# Patient Record
Sex: FEMALE | Race: WHITE | Employment: UNEMPLOYED | URBAN - METROPOLITAN AREA
[De-identification: names, ages, dates, MRNs, and addresses within clinical notes are randomized per-mention and may not be internally consistent; named-entity substitution may affect disease eponyms.]

---

## 2022-05-14 PROBLEM — M17.0 PRIMARY OSTEOARTHRITIS OF BOTH KNEES: Status: ACTIVE | Noted: 2022-05-14

## 2022-06-20 ENCOUNTER — APPOINTMENT (OUTPATIENT)
Dept: PHYSICAL THERAPY | Age: 57
End: 2022-06-20
Attending: FAMILY MEDICINE
Payer: COMMERCIAL

## 2022-06-23 ENCOUNTER — TELEPHONE (OUTPATIENT)
Dept: PHYSICAL THERAPY | Facility: HOSPITAL | Age: 57
End: 2022-06-23

## 2022-06-24 ENCOUNTER — APPOINTMENT (OUTPATIENT)
Dept: PHYSICAL THERAPY | Age: 57
End: 2022-06-24
Attending: FAMILY MEDICINE
Payer: COMMERCIAL

## 2022-06-27 ENCOUNTER — APPOINTMENT (OUTPATIENT)
Dept: PHYSICAL THERAPY | Age: 57
End: 2022-06-27
Attending: FAMILY MEDICINE
Payer: COMMERCIAL

## 2022-06-27 ENCOUNTER — TELEPHONE (OUTPATIENT)
Dept: PHYSICAL THERAPY | Facility: HOSPITAL | Age: 57
End: 2022-06-27

## 2022-06-30 ENCOUNTER — OFFICE VISIT (OUTPATIENT)
Dept: PHYSICAL THERAPY | Age: 57
End: 2022-06-30
Attending: FAMILY MEDICINE
Payer: COMMERCIAL

## 2022-06-30 PROCEDURE — 97530 THERAPEUTIC ACTIVITIES: CPT | Performed by: PHYSICAL THERAPIST

## 2022-06-30 PROCEDURE — 97161 PT EVAL LOW COMPLEX 20 MIN: CPT | Performed by: PHYSICAL THERAPIST

## 2022-06-30 PROCEDURE — 97110 THERAPEUTIC EXERCISES: CPT | Performed by: PHYSICAL THERAPIST

## 2022-07-06 ENCOUNTER — OFFICE VISIT (OUTPATIENT)
Dept: PHYSICAL THERAPY | Age: 57
End: 2022-07-06
Attending: FAMILY MEDICINE
Payer: COMMERCIAL

## 2022-07-06 PROCEDURE — 97110 THERAPEUTIC EXERCISES: CPT | Performed by: PHYSICAL THERAPIST

## 2022-07-06 NOTE — PROGRESS NOTES
Diagnosis:   Arthritis of knee (M17.10)   Referring Provider: Anuel Groves  Date of Evaluation:    6/30/2022    Precautions:  None Next MD visit:   none scheduled  Date of Surgery: n/a   Patient requested :  Rayray Pool [id number]    Insurance Primary/Secondary: [address] [place] [address] HMO / N/A     # Auth Visits: 12 visits through 9/29/2022        Subjective: C/o a little discomfort with stretches - not much. Swim ~ 5 days weekly. Had cortisone injections in both knees ~ 4 years ago. Pain: 3/10      Objective:    AROM: (* denotes performed with pain)  Knee   Flexion: R 125; L 118  Extension: R -12; L -12      Hypomobile L/R patella sup/inf glides; med/lat WNL    Assessment: Pt with some improvement in B knee AROM since PT Evaluation, although deficits persist. Progressing knee ROM, hip/knee strengthening, flexibility in effort to improved pt's function and ADL tolerance. Pt tolerated session well. Goals:    (to be met in 8-12 visits)  1. Patient to report independence with PT HEP to facilitate self management and to maintain progress made with PT.   2. Patient to have at least - 10 deg active L/R knee ext to facilitate heel strike of gait and standing tolerance. 3. Pt to have at least 125 deg active L/R knee flexion to facilitate transfers, retrieving objects from ground. 4. Patient to have 5-/5 gross hip/knee MMT for improved stair negotiation, squatting tolerance. 5. Patient to be able to walk/stand each 10 min in order to facilitate ADL. Plan: Continue PT 2 x weekly for 8-12 visits. Add quad, hamstrings, gastroc stretches. Date: 7/6/2022  TX#: 2/8-12 Date:                 TX#: 3/ Date:                 TX#: 4/ Date:                 TX#: 5/ Date: Tx#: 6/   There Ex:   HEP review; see below.    Supine LE supported on blue ball: B knee flex <-> ext AAROM 5 x 10 sec each  Bridge on blue ball x10 reps  S/L L/R hip ER x 10 reps each without band, 2 x 10 reps each using RTB  Supine L/R hip ER ROM/stretch 2x 20 sec each  Stationary bike: level 1  - pt c/o knee pain forward, stopped; backward x 1 min  Reformer: B squats x 20 reps using red x 2, blue, yellow, green; L/R single leg squat x 10 reps using red x 2, blue, yellow, green       Manual:   L/R patella sup/inf glides grade II-III                       HEP: Seated L/R knee ext mobilization/stretch, Seated L/R knee flexion ROM/heel slides, s/l hip circles, bridge with ball/pillow for adductor isometric    Charges: 3 TE     Total Timed Treatment: 40 min  Total Treatment Time: 45 min

## 2022-07-07 ENCOUNTER — HOSPITAL ENCOUNTER (OUTPATIENT)
Dept: MAMMOGRAPHY | Facility: HOSPITAL | Age: 57
Discharge: HOME | End: 2022-07-07
Attending: FAMILY MEDICINE
Payer: COMMERCIAL

## 2022-07-07 DIAGNOSIS — Z12.31 ENCOUNTER FOR SCREENING MAMMOGRAM FOR BREAST CANCER: ICD-10-CM

## 2022-07-07 PROCEDURE — 77063 BREAST TOMOSYNTHESIS BI: CPT | Performed by: FAMILY MEDICINE

## 2022-07-07 PROCEDURE — 77067 SCR MAMMO BI INCL CAD: CPT | Performed by: FAMILY MEDICINE

## 2022-07-08 ENCOUNTER — OFFICE VISIT (OUTPATIENT)
Dept: PHYSICAL THERAPY | Age: 57
End: 2022-07-08
Attending: FAMILY MEDICINE
Payer: COMMERCIAL

## 2022-07-08 PROCEDURE — 97110 THERAPEUTIC EXERCISES: CPT | Performed by: PHYSICAL THERAPIST

## 2022-07-08 NOTE — PROGRESS NOTES
Diagnosis:   Arthritis of knee (M17.10)   Referring Provider: Rosa Aguila  Date of Evaluation:    6/30/2022    Precautions:  None Next MD visit:   none scheduled  Date of Surgery: n/a   Patient requested :  Marni Becker #[id number]    Insurance Primary/Secondary: [address] [place] [address] HMO / N/A     # Auth Visits: 12 visits through 9/29/2022        Subjective:C/o pain above knees starting yesterday when transferring to standing after sitting. Knees were a little sore for a few hours after last session. Pain: 5-6/10      Objective:    Hypomobile R patella sup/inf glides, L WFL today; L/R med/lat glides WNL    Assessment:   Pt with some c/o B knee pain - quad tendon region. Pt denies c/o upon palpation to L/R quad tendons. Incorporated additional quad strengthening and stretching today. Pt tolerated well. Pt reported 1/10 B knee pain at close of PT session  Pt with improved there ex tolerance today without c/o. Pt reports continues to swim ~ 5 days weekly, which helps with her knee c/o. Advised that swimming beneficial for B knee OA, to continue as well tolerated. Goals:    (to be met in 8-12 visits)  1. Patient to report independence with PT HEP to facilitate self management and to maintain progress made with PT.   2. Patient to have at least - 10 deg active L/R knee ext to facilitate heel strike of gait and standing tolerance. 3. Pt to have at least 125 deg active L/R knee flexion to facilitate transfers, retrieving objects from ground. 4. Patient to have 5-/5 gross hip/knee MMT for improved stair negotiation, squatting tolerance. 5. Patient to be able to walk/stand each 10 min in order to facilitate ADL. Plan: Continue PT 2 x weekly for 8-12 visits. Pt reports going to [facility] for 2 weeks, will return to PT 8/2022. Advised Hep compliance as well tolerated. Date: 7/6/2022  TX#: 2/8-12 Date:7/8/2022                 TX#: 3/8-12 Date:                 TX#: 4/ Date:                 TX#: 5/ Date:    Tx#: 6/   There Ex:   HEP review; see below. Supine LE supported on blue ball: B knee flex <-> ext AAROM 5 x 10 sec each  Bridge on blue ball x10 reps  S/L L/R hip ER x 10 reps each without band, 2 x 10 reps each using RTB  Supine L/R hip ER ROM/stretch 2x 20 sec each  Stationary bike: level 1  - pt c/o knee pain forward, stopped; backward x 1 min  Reformer: B squats x 20 reps using red x 2, blue, yellow, green; L/R single leg squat x 10 reps using red x 2, blue, yellow, green There Ex:   HEP review; see below.    Supine LE supported on blue ball: B knee flex <-> ext AAROM 5 x 10 sec each  Bridge on blue ball 2 x10 reps  Supine L/R hamstrings stretches  X 20 sec each  Supine L/R SAQ with bolster under knees 10 x 3 sec each  Supine L/R hip ER ROM/stretch 2x 20 sec each  S/L L/R hip ER x 25 reps each without band due to c/o knee pain with band  Prone L/R quad stretch using strap 3 x 20 sec each  Reviewed considerations for knees: MHP/cold pack and when appropriate; knee braces with patella aperture - pt reports has used in past without reduction in c/o; supportive footwear  Stationary bike: level 1 x 2 min  Reformer: B squats with RTB proximal to knees for hip abd/ER 2 x 10 reps - using all springs/tension        Manual:   L/R patella sup/inf glides grade II-III   Manual:   L/R patella sup/inf glides grade II-III                    HEP: Seated L/R knee ext mobilization/stretch, Seated L/R knee flexion ROM/heel slides, s/l hip circles, bridge with ball/pillow for adductor isometric    Charges: 3 TE     Total Timed Treatment: 40 min  Total Treatment Time: 45 min

## 2022-07-11 ENCOUNTER — APPOINTMENT (OUTPATIENT)
Dept: PHYSICAL THERAPY | Age: 57
End: 2022-07-11
Attending: FAMILY MEDICINE
Payer: COMMERCIAL

## 2022-07-14 ENCOUNTER — APPOINTMENT (OUTPATIENT)
Dept: PHYSICAL THERAPY | Age: 57
End: 2022-07-14
Attending: FAMILY MEDICINE
Payer: COMMERCIAL

## 2022-08-01 ENCOUNTER — APPOINTMENT (OUTPATIENT)
Dept: PHYSICAL THERAPY | Age: 57
End: 2022-08-01
Attending: FAMILY MEDICINE
Payer: COMMERCIAL

## 2022-08-03 ENCOUNTER — APPOINTMENT (OUTPATIENT)
Dept: PHYSICAL THERAPY | Age: 57
End: 2022-08-03
Payer: COMMERCIAL

## 2022-08-08 ENCOUNTER — APPOINTMENT (OUTPATIENT)
Dept: PHYSICAL THERAPY | Age: 57
End: 2022-08-08
Payer: COMMERCIAL

## 2022-08-10 ENCOUNTER — APPOINTMENT (OUTPATIENT)
Dept: PHYSICAL THERAPY | Age: 57
End: 2022-08-10
Payer: COMMERCIAL

## 2022-08-17 ENCOUNTER — APPOINTMENT (OUTPATIENT)
Dept: PHYSICAL THERAPY | Age: 57
End: 2022-08-17
Payer: COMMERCIAL

## 2022-08-19 ENCOUNTER — OFFICE VISIT (OUTPATIENT)
Dept: PHYSICAL THERAPY | Age: 57
End: 2022-08-19
Attending: FAMILY MEDICINE
Payer: COMMERCIAL

## 2022-08-19 PROCEDURE — 97110 THERAPEUTIC EXERCISES: CPT | Performed by: PHYSICAL THERAPIST

## 2022-08-19 NOTE — PROGRESS NOTES
Diagnosis:   Arthritis of knee (M17.10)   Referring Provider: Salma Prakash  Date of Evaluation:    6/30/2022    Precautions:  None Next MD visit:   none scheduled  Date of Surgery: n/a   Patient requested :  Amairani Rosalinda # [id number]    Insurance Primary/Secondary: [facility] HMO / N/A     # Auth Visits: 12 visits through 9/29/2022        Subjective: Was away in [facility]; continued with PT HEP, but I was unable to swim. My knees hurt a little. My knees hurt when I walk a lot and when I bend, too. A little improvement in ability to stand since I started PT. Continue to take medication that was prescribed in [facility].     Pain: 5/10      Objective:    Hypomobile R patella sup/inf glides, L WFL today; L/R med/lat glides WNL    Observation: Standing: B genu valgus, B knees remain slightly flexed, pes planus     AROM: (* denotes performed with pain)  Knee   Flexion: R 130; L 116  Extension: R -1; L -14        Accessory motion: Hypomobile R patella superior glide B patella mobility WNL otherwise. Strength/MMT: (* denotes performed with pain)  Knee    Flexion: R 5-/5; L 5-/5  Extension: R 5-/5; L 5-/5        Assessment:   Patient returns to PT after last attending 7/8/2022; she reports that she was away in [facility].  Patient with improvement in R knee flexion AROM, subjective report of improved standing tolerance. Patient with continued knee ROM deficits, genu valgus, knee OA likely contributing to c/o. Continues to address these deficits with there ex, pt education. Pt reported pain reduced to 2/10 at close of PT session. Goals:    (to be met in 8-12 visits)  1. Patient to report independence with PT HEP to facilitate self management and to maintain progress made with PT.   2. Patient to have at least - 10 deg active L/R knee ext to facilitate heel strike of gait and standing tolerance. 3. Pt to have at least 125 deg active L/R knee flexion to facilitate transfers, retrieving objects from ground.    4. Patient to have 5-/5 gross hip/knee MMT for improved stair negotiation, squatting tolerance. 5. Patient to be able to walk/stand each 10 min in order to facilitate ADL. Plan: Continue PT 2 x weekly for 8-12 visits. Pt may prefer to continue with PT HEP after ~ 2 more sessions. Next session: stationary bike, gastroc stretches. Date: 7/6/2022  TX#: 2/8-12 Date:7/8/2022                 TX#: 3/8-12 Date: 8/19/2022                TX#: 4/8-12 Date:                 TX#: 5/ Date: Tx#: 6/   There Ex:   HEP review; see below. Supine LE supported on blue ball: B knee flex <-> ext AAROM 5 x 10 sec each  Bridge on blue ball x10 reps  S/L L/R hip ER x 10 reps each without band, 2 x 10 reps each using RTB  Supine L/R hip ER ROM/stretch 2x 20 sec each  Stationary bike: level 1  - pt c/o knee pain forward, stopped; backward x 1 min  Reformer: B squats x 20 reps using red x 2, blue, yellow, green; L/R single leg squat x 10 reps using red x 2, blue, yellow, green There Ex:   HEP review; see below. Supine LE supported on blue ball: B knee flex <-> ext AAROM 5 x 10 sec each  Bridge on blue ball 2 x10 reps  Supine L/R hamstrings stretches  X 20 sec each  Supine L/R SAQ with bolster under knees 10 x 3 sec each  Supine L/R hip ER ROM/stretch 2x 20 sec each  S/L L/R hip ER x 25 reps each without band due to c/o knee pain with band  Prone L/R quad stretch using strap 3 x 20 sec each  Reviewed considerations for knees: MHP/cold pack and when appropriate; knee braces with patella aperture - pt reports has used in past without reduction in c/o; supportive footwear  Stationary bike: level 1 x 2 min  Reformer: B squats with RTB proximal to knees for hip abd/ER 2 x 10 reps - using all springs/tension   There Ex:   HEP review; see below.    Bridge on red ball x 30 reps  Supine B knee flexion AAROM with LE supported on ball 5 x 10 sec each  Seated L/R hamstrings stretch/knee ext stretch/ROM 3 x 15 sec each  Supine L/R SAQ with ball under knees 10 x 3 sec each  Supine L/R hip ER ROM/stretch 2x 20 sec each  S/L L/R hip ER x 25 reps each without band   Prone L/R quad stretch using strap 3 x 20 sec each  Seated L/R hamstrings stretch/knee ext stretch/ROM 3 x 15 sec each  Reformer: B squats  3 x 10 reps - using all springs/tension - cuing for neutral knees/limit valgus  - progress next session  B gastroc stretch on slant board - next session  Stationary bike - next session     Manual:   L/R patella sup/inf glides grade II-III   Manual:   L/R patella sup/inf glides grade II-III There Activities:   Pt education re: shoes with arch support, supportive shoes       Manual:   L/R patella sup/inf glides grade II-III            HEP: Seated L/R knee ext mobilization/stretch, Seated L/R knee flexion ROM/heel slides, s/l hip circles, bridge with ball/pillow for adductor isometric    Charges: 3 TE     Total Timed Treatment: 40 min  Total Treatment Time: 45 min

## 2022-08-22 ENCOUNTER — OFFICE VISIT (OUTPATIENT)
Dept: PHYSICAL THERAPY | Age: 57
End: 2022-08-22
Attending: FAMILY MEDICINE
Payer: COMMERCIAL

## 2022-08-22 PROCEDURE — 97110 THERAPEUTIC EXERCISES: CPT | Performed by: PHYSICAL THERAPIST

## 2022-08-22 NOTE — PROGRESS NOTES
Diagnosis:   Arthritis of knee (M17.10)   Referring Provider: Anuel Groves  Date of Evaluation:    6/30/2022    Precautions:  None Next MD visit:   none scheduled  Date of Surgery: n/a   Patient requested :  Layton Sparks # [id number]    Insurance Primary/Secondary: [address] [place], [place] [address]O / N/A     # Auth Visits: 12 visits through 9/29/2022        Subjective: My knees hurt a little today. C/o some pain when stand after having sat, exercises help. Went swimming today. C/o R hip/lateral thigh pain when I lay on my side. Pain: 3/10      Objective:    Hypomobile R patella sup/inf glides, L WFL today; L/R med/lat glides WNL    Observation: Standing: B genu valgus, B knees remain slightly flexed, pes planus     Palpation: Pt denies c/o upon palpation to R greater trochanter of femur, + c/o R ITB    Assessment:   Discussed considerations for R lateral hip/ITB c/o in s/l; see below. Reviewed exercises to consider for knee c/o with extended sitting; see below. Patient with R hip c/o suggestive of hip bursitis/ITB irritation/sensitivity to R s/l. Pt denies c/o with standing/WB. Patient continues to respond well to PT sessions, reports less knee pain following. Patient with continued knee ROM deficits, genu valgus, knee OA likely contributing to residual c/o. Goals:    (to be met in 8-12 visits)  1. Patient to report independence with PT HEP to facilitate self management and to maintain progress made with PT.   2. Patient to have at least - 10 deg active L/R knee ext to facilitate heel strike of gait and standing tolerance. 3. Pt to have at least 125 deg active L/R knee flexion to facilitate transfers, retrieving objects from ground. 4. Patient to have 5-/5 gross hip/knee MMT for improved stair negotiation, squatting tolerance. 5. Patient to be able to walk/stand each 10 min in order to facilitate ADL. Plan: Continue PT 2 x weekly for 8-12 visits.  Pt may prefer to continue with PT HEP after ~ 1 more session. Next session: stationary bike, possibly POC and discharge. Date: 7/6/2022  TX#: 2/8-12 Date:7/8/2022                 TX#: 3/8-12 Date: 8/19/2022                TX#: 4/8-12 Date:  8/22/2022               TX#: 5/8-12 Date: Tx#: 6/ There Ex:   HEP review; see below. Supine LE supported on blue ball: B knee flex <-> ext AAROM 5 x 10 sec each  Bridge on blue ball x10 reps  S/L L/R hip ER x 10 reps each without band, 2 x 10 reps each using RTB  Supine L/R hip ER ROM/stretch 2x 20 sec each  Stationary bike: level 1  - pt c/o knee pain forward, stopped; backward x 1 min  Reformer: B squats x 20 reps using red x 2, blue, yellow, green; L/R single leg squat x 10 reps using red x 2, blue, yellow, green There Ex:   HEP review; see below. Supine LE supported on blue ball: B knee flex <-> ext AAROM 5 x 10 sec each  Bridge on blue ball 2 x10 reps  Supine L/R hamstrings stretches  X 20 sec each  Supine L/R SAQ with bolster under knees 10 x 3 sec each  Supine L/R hip ER ROM/stretch 2x 20 sec each  S/L L/R hip ER x 25 reps each without band due to c/o knee pain with band  Prone L/R quad stretch using strap 3 x 20 sec each  Reviewed considerations for knees: MHP/cold pack and when appropriate; knee braces with patella aperture - pt reports has used in past without reduction in c/o; supportive footwear  Stationary bike: level 1 x 2 min  Reformer: B squats with RTB proximal to knees for hip abd/ER 2 x 10 reps - using all springs/tension   There Ex:   HEP review; see below.    Bridge on red ball x 30 reps  Supine B knee flexion AAROM with LE supported on ball 5 x 10 sec each  Seated L/R hamstrings stretch/knee ext stretch/ROM 3 x 15 sec each  Supine L/R SAQ with ball under knees 10 x 3 sec each  Supine L/R hip ER ROM/stretch 2x 20 sec each  S/L L/R hip ER x 25 reps each without band   Prone L/R quad stretch using strap 3 x 20 sec each  Seated L/R hamstrings stretch/knee ext stretch/ROM 3 x 15 sec each  Reformer: APRIL squats  3 x 10 reps - using all springs/tension - cuing for neutral knees/limit valgus  - progress next session  B gastroc stretch on slant board - next session  Stationary bike - next session There Ex:   May consider LAQ, heel/toe raises when sitting in effort to reduce c/o with transfer to stand  Re: R hip c/o in s/l, continue with HEP s/l hip circles, bridge, may add supine L/R across body piriformis stretch 3 x 20 sec each, massage, extra pillow under hip for R S/L or avoid R S/L until c/o resolve; pillow inbetween knees in s/l  Bridge on red ball x 30 reps  Supine B knee flexion AAROM with LE supported on ball 5 x 10 sec each  Seated L/R hamstrings stretch/knee ext stretch/ROM 3 x 20 sec each  Supine L/R SAQ with ball under knees 10 x 3 sec each  S/L L/R hip ER - modified due to pt c/o R Hip discomfort in S/L; Supine B hip ER using blue band x 30 reps - progress next session  Prone L/R quad stretch using strap 3 x 20 sec each  Reformer: B squats  X 5 reps - using all springs/tension; L/R unilateral squat 2 x 10 reps each using all springs/tension - mild L knee c/o, cuing for valgus control  B gastroc stretch on slant board 3 x 20 sec each  Stationary bike - next session    Manual:   L/R patella sup/inf glides grade II-III   Manual:   L/R patella sup/inf glides grade II-III There Activities:   Pt education re: shoes with arch support, supportive shoes       Manual:   L/R patella sup/inf glides grade II-III Manual:   L/R patella sup/inf glides grade II-III  STM R gluteals and ITB using foam roller            HEP: Seated L/R knee ext mobilization/stretch, Seated L/R knee flexion ROM/heel slides, s/l hip circles, bridge with ball/pillow for adductor isometric. May consider across body piriformis stretch, STM ITB/gluteals for hip c/o.      Charges: 3 TE     Total Timed Treatment: 40 min  Total Treatment Time: 45 min

## 2022-08-24 ENCOUNTER — OFFICE VISIT (OUTPATIENT)
Dept: PHYSICAL THERAPY | Age: 57
End: 2022-08-24
Attending: FAMILY MEDICINE
Payer: COMMERCIAL

## 2022-08-24 PROCEDURE — 97110 THERAPEUTIC EXERCISES: CPT | Performed by: PHYSICAL THERAPIST

## 2022-08-24 PROCEDURE — 97140 MANUAL THERAPY 1/> REGIONS: CPT | Performed by: PHYSICAL THERAPIST

## 2022-08-24 NOTE — PROGRESS NOTES
Diagnosis:   Arthritis of knee (M17.10)   Referring Provider: Rosalia Lord  Date of Evaluation:    6/30/2022    Precautions:  None Next MD visit:   none scheduled  Date of Surgery: n/a   Patient requested :  Jordi Marino # [id number]    Insurance Primary/Secondary: [employer] Sheldon O / N/A     # Auth Visits: 12 visits through 9/29/2022        Subjective: My knees are well today. Roller helps with my hip c/o; no longer having discomfort since. Would like to continue with PT. Completing HEP, swim 5 days weekly. Pain: 2/10      Objective:   Knee flexion AROM: R 130 deg, L 120 deg    Hypomobile R patella sup/inf glides, L WFL today; L/R med/lat glides WNL    Observation: Standing: B genu valgus, B knees remain slightly flexed, pes planus     Palpation: Pt denies c/o upon palpation to R greater trochanter of femur, + c/o R ITB    Assessment:   Patient with less knee and hip/ITB c/o, responding well to current PT POC. Pt denied questions re: considerations for hip c/o that discussed previous PT session. Encouraged pt to continue with weekly swimming. Patient with continued knee ROM deficits, genu valgus, knee OA likely contributing to residual c/o. Goals:    (to be met in 8-12 visits)  1. Patient to report independence with PT HEP to facilitate self management and to maintain progress made with PT.   2. Patient to have at least - 10 deg active L/R knee ext to facilitate heel strike of gait and standing tolerance. 3. Pt to have at least 125 deg active L/R knee flexion to facilitate transfers, retrieving objects from ground. 4. Patient to have 5-/5 gross hip/knee MMT for improved stair negotiation, squatting tolerance. 5. Patient to be able to walk/stand each 10 min in order to facilitate ADL. Plan: Continue PT 2 x weekly for 8-12 visits. Patient prefers to continue with PT.    Date: 7/6/2022  TX#: 2/8-12 Date:7/8/2022                 TX#: 3/8-12 Date: 8/19/2022                TX#: 4/8-12 Date: 8/22/2022               TX#: 5/8-12 Date: 8/24/2022  Tx#: 6/8-12   There Ex:   HEP review; see below. Supine LE supported on blue ball: B knee flex <-> ext AAROM 5 x 10 sec each  Bridge on blue ball x10 reps  S/L L/R hip ER x 10 reps each without band, 2 x 10 reps each using RTB  Supine L/R hip ER ROM/stretch 2x 20 sec each  Stationary bike: level 1  - pt c/o knee pain forward, stopped; backward x 1 min  Reformer: B squats x 20 reps using red x 2, blue, yellow, green; L/R single leg squat x 10 reps using red x 2, blue, yellow, green There Ex:   HEP review; see below. Supine LE supported on blue ball: B knee flex <-> ext AAROM 5 x 10 sec each  Bridge on blue ball 2 x10 reps  Supine L/R hamstrings stretches  X 20 sec each  Supine L/R SAQ with bolster under knees 10 x 3 sec each  Supine L/R hip ER ROM/stretch 2x 20 sec each  S/L L/R hip ER x 25 reps each without band due to c/o knee pain with band  Prone L/R quad stretch using strap 3 x 20 sec each  Reviewed considerations for knees: MHP/cold pack and when appropriate; knee braces with patella aperture - pt reports has used in past without reduction in c/o; supportive footwear  Stationary bike: level 1 x 2 min  Reformer: B squats with RTB proximal to knees for hip abd/ER 2 x 10 reps - using all springs/tension   There Ex:   HEP review; see below.    Bridge on red ball x 30 reps  Supine B knee flexion AAROM with LE supported on ball 5 x 10 sec each  Seated L/R hamstrings stretch/knee ext stretch/ROM 3 x 15 sec each  Supine L/R SAQ with ball under knees 10 x 3 sec each  Supine L/R hip ER ROM/stretch 2x 20 sec each  S/L L/R hip ER x 25 reps each without band   Prone L/R quad stretch using strap 3 x 20 sec each  Seated L/R hamstrings stretch/knee ext stretch/ROM 3 x 15 sec each  Reformer: B squats  3 x 10 reps - using all springs/tension - cuing for neutral knees/limit valgus  - progress next session  B gastroc stretch on slant board - next session  Stationary bike - next session There Ex:   May consider LAQ, heel/toe raises when sitting in effort to reduce c/o with transfer to stand  Re: R hip c/o in s/l, continue with HEP s/l hip circles, bridge, may add supine L/R across body piriformis stretch 3 x 20 sec each, massage, extra pillow under hip for R S/L or avoid R S/L until c/o resolve; pillow inbetween knees in s/l  Bridge on red ball x 30 reps  Supine B knee flexion AAROM with LE supported on ball 5 x 10 sec each  Seated L/R hamstrings stretch/knee ext stretch/ROM 3 x 20 sec each  Supine L/R SAQ with ball under knees 10 x 3 sec each  S/L L/R hip ER - modified due to pt c/o R Hip discomfort in S/L; Supine B hip ER using blue band x 30 reps - progress next session  Prone L/R quad stretch using strap 3 x 20 sec each  Reformer: B squats  X 5 reps - using all springs/tension; L/R unilateral squat 2 x 10 reps each using all springs/tension - mild L knee c/o, cuing for valgus control  B gastroc stretch on slant board 3 x 20 sec each  Stationary bike - next session There Ex:   L = R LE WB in standing, as able; pt reports usually WB greater on R LE due to L LE discomfort  Bridge on red ball x 30 reps  Supine B knee flexion AAROM with LE supported on ball 5 x 10 sec each  Seated L/R hamstrings stretch/knee ext stretch/ROM 3 x 20 sec each  Supine L/R SAQ with ball under knees 10 x 3 sec each  Stationary bike: level 1 x 1 min  Lateral steps at bar, RTB 3 x ~16 feet   Seated L/R hamstrings stretches x 30 sec each  Reformer - insufficient time, not completed     Manual:   L/R patella sup/inf glides grade II-III   Manual:   L/R patella sup/inf glides grade II-III There Activities:   Pt education re: shoes with arch support, supportive shoes       Manual:   L/R patella sup/inf glides grade II-III Manual:   L/R patella sup/inf glides grade II-III  STM R gluteals and ITB using foam roller  Manual:   L/R patella sup/inf glides grade II-III  STM R gluteals and ITB using foam roller HEP: Seated L/R knee ext mobilization/stretch, Seated L/R knee flexion ROM/heel slides, s/l hip circles, bridge with ball/pillow for adductor isometric. May consider across body piriformis stretch, STM ITB/gluteals for hip c/o.      Charges: 2 TE, 1 manual    Total Timed Treatment: 40 min  Total Treatment Time: 45 min

## 2022-08-29 ENCOUNTER — TELEPHONE (OUTPATIENT)
Dept: INTERNAL MEDICINE CLINIC | Facility: CLINIC | Age: 57
End: 2022-08-29

## 2022-09-01 ENCOUNTER — OFFICE VISIT (OUTPATIENT)
Dept: PHYSICAL THERAPY | Age: 57
End: 2022-09-01
Attending: FAMILY MEDICINE
Payer: COMMERCIAL

## 2022-09-01 PROCEDURE — 97140 MANUAL THERAPY 1/> REGIONS: CPT | Performed by: PHYSICAL THERAPIST

## 2022-09-01 PROCEDURE — 97110 THERAPEUTIC EXERCISES: CPT | Performed by: PHYSICAL THERAPIST

## 2022-09-15 ENCOUNTER — OFFICE VISIT (OUTPATIENT)
Dept: PHYSICAL THERAPY | Age: 57
End: 2022-09-15
Attending: FAMILY MEDICINE
Payer: COMMERCIAL

## 2022-09-15 PROCEDURE — 97110 THERAPEUTIC EXERCISES: CPT | Performed by: PHYSICAL THERAPIST

## 2022-09-15 PROCEDURE — 97140 MANUAL THERAPY 1/> REGIONS: CPT | Performed by: PHYSICAL THERAPIST

## 2022-09-15 NOTE — PROGRESS NOTES
Diagnosis:   Arthritis of knee (M17.10)   Referring Provider: Diane Youngblood  Date of Evaluation:    6/30/2022    Precautions:  None Next MD visit:   none scheduled  Date of Surgery: n/a   Patient requested :  Urmila Clarke # [id number]    Insurance Primary/Secondary: [address] [place], [place] [address]O / N/A     # Auth Visits: 12 visits through 9/29/2022        Subjective: My knees are better since started PT. Completing HEP, swimming 5 days weekly. Pain: 3/10      Objective:   Knee AROM: Flexion: R 130 deg, L 120 deg; Extension: L - 10 deg, R - 15 deg    Hypomobile R/L patella sup/inf glides     Observation: Standing: B genu valgus, B knees remain slightly flexed, pes planus     Palpation: Pt denies c/o upon palpation to R greater trochanter of femur, + c/o R ITB    + L/R quad length restriction    Assessment:   Patient returns to PT after last attending 9/1/2022. She reports HEP compliance, swimming 5 days weekly, improvements in knees since started PT. Incorporated additional manual therapy for knee ROM due to continued deficits. Patient tolerated well. Patient with continued knee pain, B genu valgus, symptoms, ROM deficits consistent with knee OA. Goals:    (to be met in 8-12 visits)  1. Patient to report independence with PT HEP to facilitate self management and to maintain progress made with PT.   2. Patient to have at least - 10 deg active L/R knee ext to facilitate heel strike of gait and standing tolerance. 3. Pt to have at least 125 deg active L/R knee flexion to facilitate transfers, retrieving objects from ground. 4. Patient to have 5-/5 gross hip/knee MMT for improved stair negotiation, squatting tolerance. 5. Patient to be able to walk/stand each 10 min in order to facilitate ADL. Plan: Continue PT 2 x weekly for 8-12 visits. F/u on response to manual therapy. Progress hip/knee strengthening, knee ROM.   Date: 8/19/2022                TX#: 4/8-12 Date:  8/22/2022               TX#: 5/8-12 Date: 8/24/2022  Tx#: 6/8-12 Date: 9/1/2022  Tx #: 7/8-12 Date: 9/15/2022  Tx #: 8/8-12   There Ex:   HEP review; see below.    Bridge on red ball x 30 reps  Supine B knee flexion AAROM with LE supported on ball 5 x 10 sec each  Seated L/R hamstrings stretch/knee ext stretch/ROM 3 x 15 sec each  Supine L/R SAQ with ball under knees 10 x 3 sec each  Supine L/R hip ER ROM/stretch 2x 20 sec each  S/L L/R hip ER x 25 reps each without band   Prone L/R quad stretch using strap 3 x 20 sec each  Seated L/R hamstrings stretch/knee ext stretch/ROM 3 x 15 sec each  Reformer: B squats  3 x 10 reps - using all springs/tension - cuing for neutral knees/limit valgus  - progress next session  B gastroc stretch on slant board - next session  Stationary bike - next session There Ex:   May consider LAQ, heel/toe raises when sitting in effort to reduce c/o with transfer to stand  Re: R hip c/o in s/l, continue with HEP s/l hip circles, bridge, may add supine L/R across body piriformis stretch 3 x 20 sec each, massage, extra pillow under hip for R S/L or avoid R S/L until c/o resolve; pillow inbetween knees in s/l  Bridge on red ball x 30 reps  Supine B knee flexion AAROM with LE supported on ball 5 x 10 sec each  Seated L/R hamstrings stretch/knee ext stretch/ROM 3 x 20 sec each  Supine L/R SAQ with ball under knees 10 x 3 sec each  S/L L/R hip ER - modified due to pt c/o R Hip discomfort in S/L; Supine B hip ER using blue band x 30 reps - progress next session  Prone L/R quad stretch using strap 3 x 20 sec each  Reformer: B squats  X 5 reps - using all springs/tension; L/R unilateral squat 2 x 10 reps each using all springs/tension - mild L knee c/o, cuing for valgus control  B gastroc stretch on slant board 3 x 20 sec each  Stationary bike - next session There Ex:   L = R LE WB in standing, as able; pt reports usually WB greater on R LE due to L LE discomfort  Bridge on red ball x 30 reps  Supine B knee flexion AAROM with LE supported on ball 5 x 10 sec each  Seated L/R hamstrings stretch/knee ext stretch/ROM 3 x 20 sec each  Supine L/R SAQ with ball under knees 10 x 3 sec each  Stationary bike: level 1 x 1 min  Lateral steps at bar, RTB 3 x ~16 feet   Seated L/R hamstrings stretches x 30 sec each  Reformer - insufficient time, not completed   There Ex:   Stationary bike: level 1 x 3   Bridge on blue ball 20 x 3 sec each  Prone L/R quad stretch using strap 3 x 30 sec each  Seated L/R knee ext/hamstrings stretches x 1 min each  Lateral steps at bar, RTB (prox to knees) 2 x ~ 40 feet  Monster walks (ant/post diagonals) using RTB (prox to knees) 2 x ~ 40 feet  Reformer: B squats  X 10 reps - using all springs/tension; L/R unilateral squat 2 x 10 reps each using all springs/tension - verbal/visual cuing for valgus control  B gastroc stretch on slant board 2 x 30 sec each There Ex:   Stationary bike: level 1 x 3   Pool exercise review: lateral and monster walk; squats; SLS with 3 way hip  Lateral steps at bar GTB at ankles 3 x ~16 feet   Monster walks (ant/post diagonals) using GTB at ankles 3 x ~ 16 feet  Reformer: B squats  X 10 reps - using all springs/tension; L/R unilateral squat 2 x 10 reps each using all springs/tension; B heel raises x 20 reps all springs   B gastroc stretch on slant board x 30 sec each  Standing L/R hamstrings stretch x 30 sec each L/R   There Activities:   Pt education re: shoes with arch support, supportive shoes       Manual:   L/R patella sup/inf glides grade II-III Manual:   L/R patella sup/inf glides grade II-III  STM R gluteals and ITB using foam roller  Manual:   L/R patella sup/inf glides grade II-III  STM R gluteals and ITB using foam roller  Manual:   L/R patella sup/inf glides grade II-III  STM R gluteals and ITB using foam roller  Manual:   L/R patella sup/inf glides grade II-III  STM R gluteals and ITB using foam roller  Grade II-III L/R tibia and femur ext mobilization  L/R knee ext ROM/stretch 3 x 15 sec each  S/L L/R quad stretches 2 x 20 sec each  S/L L/R knee flexion ROM/stretch 2 x 20 sec each          HEP: Seated L/R knee ext mobilization/stretch, Seated L/R knee flexion ROM/heel slides, s/l hip circles, bridge with ball/pillow for adductor isometric, optional prone quad stretch with strap. May consider across body piriformis stretch, STM ITB/gluteals for hip c/o.      Charges: 2 TE, 1 manual    Total Timed Treatment: 40 min  Total Treatment Time: 45 min

## 2022-09-21 ENCOUNTER — OFFICE VISIT (OUTPATIENT)
Dept: PHYSICAL THERAPY | Age: 57
End: 2022-09-21
Attending: FAMILY MEDICINE

## 2022-09-21 PROCEDURE — 97140 MANUAL THERAPY 1/> REGIONS: CPT | Performed by: PHYSICAL THERAPIST

## 2022-09-21 PROCEDURE — 97110 THERAPEUTIC EXERCISES: CPT | Performed by: PHYSICAL THERAPIST

## 2022-09-21 PROCEDURE — 97014 ELECTRIC STIMULATION THERAPY: CPT | Performed by: PHYSICAL THERAPIST

## 2022-09-21 NOTE — PROGRESS NOTES
Diagnosis:   Arthritis of knee (M17.10)   Referring Provider: Cory Luther  Date of Evaluation:    6/30/2022    Precautions:  None Next MD visit:   none scheduled  Date of Surgery: n/a   Patient requested : Ting Elena ID # [id number]    Insurance Primary/Secondary: [address] [place], [place] [address]O / N/A     # Auth Visits: 12 visits through 9/29/2022        Subjective: My knees are so so today. Did well with manual therapy last session. Pain: 3-4/10      Objective:   Knee AROM: Flexion: R 130 deg, L 120 deg; Extension: L - 10 deg, R - 12 deg    Min hypomobile R/L patella sup/inf glides     Observation: Standing: B genu valgus, B knees remain slightly flexed, pes planus       Assessment:   Patient with limited progress with knee ROM. Encouraged Hep compliance/swimming in effort to reduce further ROM, strength loss. Used IFC to assist with pain management. Patient denied change in c/o; will reassess next session. Patient reports less knee and hip pain since starting PT, improvement. Goals:    (to be met in 8-12 visits)  1. Patient to report independence with PT HEP to facilitate self management and to maintain progress made with PT.   2. Patient to have at least - 10 deg active L/R knee ext to facilitate heel strike of gait and standing tolerance. 3. Pt to have at least 125 deg active L/R knee flexion to facilitate transfers, retrieving objects from ground. 4. Patient to have 5-/5 gross hip/knee MMT for improved stair negotiation, squatting tolerance. 5. Patient to be able to walk/stand each 10 min in order to facilitate ADL. Plan: Continue PT 2 x weekly for 8-12 visits. F/u on response to IFC. Progress hip/knee strengthening, knee ROM.   Date:  8/22/2022               TX#: 5/8-12 Date: 8/24/2022  Tx#: 6/8-12 Date: 9/1/2022  Tx #: 7/8-12 Date: 9/15/2022  Tx #: 8/8-12 Date: 9/21/2022  Tx #: 9/12   There Ex:   May consider LAQ, heel/toe raises when sitting in effort to reduce c/o with transfer to stand  Re: R hip c/o in s/l, continue with HEP s/l hip circles, bridge, may add supine L/R across body piriformis stretch 3 x 20 sec each, massage, extra pillow under hip for R S/L or avoid R S/L until c/o resolve; pillow inbetween knees in s/l  Bridge on red ball x 30 reps  Supine B knee flexion AAROM with LE supported on ball 5 x 10 sec each  Seated L/R hamstrings stretch/knee ext stretch/ROM 3 x 20 sec each  Supine L/R SAQ with ball under knees 10 x 3 sec each  S/L L/R hip ER - modified due to pt c/o R Hip discomfort in S/L; Supine B hip ER using blue band x 30 reps - progress next session  Prone L/R quad stretch using strap 3 x 20 sec each  Reformer: B squats  X 5 reps - using all springs/tension; L/R unilateral squat 2 x 10 reps each using all springs/tension - mild L knee c/o, cuing for valgus control  B gastroc stretch on slant board 3 x 20 sec each  Stationary bike - next session There Ex:   L = R LE WB in standing, as able; pt reports usually WB greater on R LE due to L LE discomfort  Bridge on red ball x 30 reps  Supine B knee flexion AAROM with LE supported on ball 5 x 10 sec each  Seated L/R hamstrings stretch/knee ext stretch/ROM 3 x 20 sec each  Supine L/R SAQ with ball under knees 10 x 3 sec each  Stationary bike: level 1 x 1 min  Lateral steps at bar, RTB 3 x ~16 feet   Seated L/R hamstrings stretches x 30 sec each  Reformer - insufficient time, not completed   There Ex:   Stationary bike: level 1 x 3   Bridge on blue ball 20 x 3 sec each  Prone L/R quad stretch using strap 3 x 30 sec each  Seated L/R knee ext/hamstrings stretches x 1 min each  Lateral steps at bar, RTB (prox to knees) 2 x ~ 40 feet  Monster walks (ant/post diagonals) using RTB (prox to knees) 2 x ~ 40 feet  Reformer: B squats  X 10 reps - using all springs/tension; L/R unilateral squat 2 x 10 reps each using all springs/tension - verbal/visual cuing for valgus control  B gastroc stretch on slant board 2 x 30 sec each There Ex:   Stationary bike: level 1 x 3   Pool exercise review: lateral and monster walk; squats; SLS with 3 way hip  Lateral steps at bar GTB at ankles 3 x ~16 feet   Monster walks (ant/post diagonals) using GTB at ankles 3 x ~ 16 feet  Reformer: B squats  X 10 reps - using all springs/tension; L/R unilateral squat 2 x 10 reps each using all springs/tension; B heel raises x 20 reps all springs   B gastroc stretch on slant board x 30 sec each  Standing L/R hamstrings stretch x 30 sec each L/R There Ex:   Stationary bike: level 1 x 3        There Activities:   Pt stated that a knee replacement has been advised by physician, requested info; stated that patients generally do well following TKA but that there is risk for complications with any surgery - to discuss with surgeon, PT necessary following TKA, takes some time to recover; stated that patients generally have TKA when ADL/lifestlye compromised by knee pain/symptoms related to OA   Manual:   L/R patella sup/inf glides grade II-III  STM R gluteals and ITB using foam roller  Manual:   L/R patella sup/inf glides grade II-III  STM R gluteals and ITB using foam roller  Manual:   L/R patella sup/inf glides grade II-III  STM R gluteals and ITB using foam roller  Manual:   L/R patella sup/inf glides grade II-III  STM R gluteals and ITB using foam roller  Grade II-III L/R tibia and femur ext mobilization  L/R knee ext ROM/stretch 3 x 15 sec each  S/L L/R quad stretches 2 x 20 sec each  S/L L/R knee flexion ROM/stretch 2 x 20 sec each Manual:   L/R patella sup/inf and med/lat glides grade II-III  Grade II-III L/R tibia and femur ext mobilization  L/R knee ext ROM/stretch 3 x 15 sec each  S/L L/R quad stretches  x 20 sec each  S/L L/R knee flexion ROM/stretch  x 20 sec each       IFC L knee x 10 min with bolster under knees    HEP: Seated L/R knee ext mobilization/stretch, Seated L/R knee flexion ROM/heel slides, s/l hip circles, bridge with ball/pillow for adductor isometric, optional prone quad stretch with strap. May consider across body piriformis stretch, STM ITB/gluteals for hip c/o.      Charges: 1 TA, 1 manual, Estim    Total Timed Treatment: 30 min  Total Treatment Time: 45 min

## 2022-09-22 ENCOUNTER — OFFICE VISIT (OUTPATIENT)
Dept: PHYSICAL THERAPY | Age: 57
End: 2022-09-22
Attending: FAMILY MEDICINE

## 2022-09-22 PROCEDURE — 97140 MANUAL THERAPY 1/> REGIONS: CPT | Performed by: PHYSICAL THERAPIST

## 2022-09-22 PROCEDURE — 97110 THERAPEUTIC EXERCISES: CPT | Performed by: PHYSICAL THERAPIST

## 2022-09-22 NOTE — PROGRESS NOTES
Diagnosis:   Arthritis of knee (M17.10)   Referring Provider: Anatoly Coates  Date of Evaluation:    6/30/2022    Precautions:  None Next MD visit:   none scheduled  Date of Surgery: n/a   Patient requested : Lafrances Bloch #[id number]    Insurance Primary/Secondary: [address] [place] [address] HMO / N/A     # Auth Visits: 12 visits through 9/29/2022        Subjective:  My L knee hurt last night - not sure if it is the change in weather or the Estim. My knees are better overall with PT and cortisone injections. My knees used to be less straight. Continue to take medication from doctor in [facility] daily; I cannot walk without it. Pain: 3-4/10      Objective:  Continued L/R knee flex/ext AROM deficits    Observation: Standing: B iliac crests ~ level, B genu valgus, B knees remain slightly flexed, mild pes planus       Assessment:   Patient with subjective report of progress. Reviewed HEP, swimming, MHP/cold pack, supportive footwear to assist with management of knee OA/pain. Patient reported L knee pain last evening - unsure if related to Estim or weather. Did not complete Estim/IFC today. Focused on there ex/manual, as pt generally reports less c/o following. Goals:    (to be met in 8-12 visits)  1. Patient to report independence with PT HEP to facilitate self management and to maintain progress made with PT.   2. Patient to have at least - 10 deg active L/R knee ext to facilitate heel strike of gait and standing tolerance. 3. Pt to have at least 125 deg active L/R knee flexion to facilitate transfers, retrieving objects from ground. 4. Patient to have 5-/5 gross hip/knee MMT for improved stair negotiation, squatting tolerance. 5. Patient to be able to walk/stand each 10 min in order to facilitate ADL. Plan: Continue PT 2 x weekly for 8-12 visits. Discharge to Hep after 2 more sessions. Progress hip/knee strengthening, knee ROM.   Date: 8/24/2022  Tx#: 6/8-12 Date: 9/1/2022  Tx #: 7/8-12 Date: 9/15/2022  Tx #: 8/8-12 Date: 9/21/2022  Tx #: 9/12 Date: 9/22/2022  Tx #: 10/12   There Ex:   L = R LE WB in standing, as able; pt reports usually WB greater on R LE due to L LE discomfort  Bridge on red ball x 30 reps  Supine B knee flexion AAROM with LE supported on ball 5 x 10 sec each  Seated L/R hamstrings stretch/knee ext stretch/ROM 3 x 20 sec each  Supine L/R SAQ with ball under knees 10 x 3 sec each  Stationary bike: level 1 x 1 min  Lateral steps at bar, RTB 3 x ~16 feet   Seated L/R hamstrings stretches x 30 sec each  Reformer - insufficient time, not completed   There Ex:   Stationary bike: level 1 x 3   Bridge on blue ball 20 x 3 sec each  Prone L/R quad stretch using strap 3 x 30 sec each  Seated L/R knee ext/hamstrings stretches x 1 min each  Lateral steps at bar, RTB (prox to knees) 2 x ~ 40 feet  Monster walks (ant/post diagonals) using RTB (prox to knees) 2 x ~ 40 feet  Reformer: B squats  X 10 reps - using all springs/tension; L/R unilateral squat 2 x 10 reps each using all springs/tension - verbal/visual cuing for valgus control  B gastroc stretch on slant board 2 x 30 sec each There Ex:   Stationary bike: level 1 x 3   Pool exercise review: lateral and monster walk; squats; SLS with 3 way hip  Lateral steps at bar GTB at ankles 3 x ~16 feet   Monster walks (ant/post diagonals) using GTB at ankles 3 x ~ 16 feet  Reformer: B squats  X 10 reps - using all springs/tension; L/R unilateral squat 2 x 10 reps each using all springs/tension; B heel raises x 20 reps all springs   B gastroc stretch on slant board x 30 sec each  Standing L/R hamstrings stretch x 30 sec each L/R There Ex:   Stationary bike: level 1 x 3  There Ex:   Stationary bike: level 2 x 3 min  Bridge on red ball x 30 reps  Supine B knee flex/ext AAROM with LE supported on ball 5 x 5 sec each  Supine L/R hamstrings stretches 2 x 20 sec each   Prone L/R quad stretch using strap 2 x 20 sec each  Lateral steps at wall GTB at ankles x ~ Kishor walks (ant/post diagonals) using GTB at ankles x  ~ 40 feet at wa;;  Reformer: B squats  X 10 reps - using all springs/tension; L/R unilateral squat 2 x 10 reps each using all springs/tension; B heel raises x 20 reps all springs  - unavailable, unable to use  Standing B heel raises x 20 reps  B gastroc stretch on slant board 3 x 20 sec each  4 inch lateral step up/down x 10 reps each L/R LE at bar  Sit - stand from mid height plinth x 10 reps - pt declined progression      There Activities:   Pt stated that a knee replacement has been advised by physician, requested info; stated that patients generally do well following TKA but that there is risk for complications with any surgery - to discuss with surgeon, PT necessary following TKA, takes some time to recover; stated that patients generally have TKA when ADL/lifestlye compromised by knee pain/symptoms related to OA There Activities:   May consider cold pack for edema; MHP for achiness x ~ 10 min   Manual:   L/R patella sup/inf glides grade II-III  STM R gluteals and ITB using foam roller  Manual:   L/R patella sup/inf glides grade II-III  STM R gluteals and ITB using foam roller  Manual:   L/R patella sup/inf glides grade II-III  STM R gluteals and ITB using foam roller  Grade II-III L/R tibia and femur ext mobilization  L/R knee ext ROM/stretch 3 x 15 sec each  S/L L/R quad stretches 2 x 20 sec each  S/L L/R knee flexion ROM/stretch 2 x 20 sec each Manual:   L/R patella sup/inf and med/lat glides grade II-III  Grade II-III L/R tibia and femur ext mobilization  L/R knee ext ROM/stretch 3 x 15 sec each  S/L L/R quad stretches  x 20 sec each  S/L L/R knee flexion ROM/stretch  x 20 sec each Manual:   L/R patella sup/inf and med/lat glides grade II-III  Grade II-III L/R tibia and femur ext mobilization  L/R knee ext ROM/stretch 3 x 15 sec each  S/L L/R quad stretches  x 20 sec each   S/L L/R knee flexion ROM/stretch  x 20 sec each - not L today due to c/o knee discomfort with stretch  STM/DTM R gluteals  STM R ITB using foam roller       IFC L knee x 10 min with bolster under knees     HEP: Seated L/R knee ext mobilization/stretch, Seated L/R knee flexion ROM/heel slides, s/l hip circles, bridge with ball/pillow for adductor isometric, optional prone quad stretch with strap, LAQ. May consider across body piriformis stretch, STM ITB/gluteals for hip c/o.      Charges: 2 TE, 1 manual  Total Timed Treatment: 40 min  Total Treatment Time: 45 min

## 2022-09-26 ENCOUNTER — OFFICE VISIT (OUTPATIENT)
Dept: PHYSICAL THERAPY | Age: 57
End: 2022-09-26
Attending: FAMILY MEDICINE

## 2022-09-26 PROCEDURE — 97140 MANUAL THERAPY 1/> REGIONS: CPT | Performed by: PHYSICAL THERAPIST

## 2022-09-26 PROCEDURE — 97014 ELECTRIC STIMULATION THERAPY: CPT | Performed by: PHYSICAL THERAPIST

## 2022-09-26 PROCEDURE — 97110 THERAPEUTIC EXERCISES: CPT | Performed by: PHYSICAL THERAPIST

## 2022-09-26 NOTE — PROGRESS NOTES
Diagnosis:   Arthritis of knee (M17.10)   Referring Provider: Fernandez Awan  Date of Evaluation:    6/30/2022    Precautions:  None Next MD visit:   none scheduled  Date of Surgery: n/a   Patient requested : [facility] #[id number]    Insurance Primary/Secondary: [employer] HMO / N/A     # Auth Visits: 12 visits through 9/29/2022        Subjective:  My knees are so so today. I would like to try the Estim again since we only tried it once. Pain: 3-4/10      Objective:  Continued L/R knee flex/ext AROM deficits    Observation:  B genu valgus, B knees remain slightly flexed      Assessment:   Completed IFC again today, per patient request in conjunction with MHP application. She tolerated it well. Reviewed MHP/cold pack considerations for knee OA/c/o. Patient with continued c/o/findings suggestive of knee OA. Reviewed HEP considerations, advised patient to continue swimming as well tolerated. Goals:    (to be met in 8-12 visits)  1. Patient to report independence with PT HEP to facilitate self management and to maintain progress made with PT.   2. Patient to have at least - 10 deg active L/R knee ext to facilitate heel strike of gait and standing tolerance. 3. Pt to have at least 125 deg active L/R knee flexion to facilitate transfers, retrieving objects from ground. 4. Patient to have 5-/5 gross hip/knee MMT for improved stair negotiation, squatting tolerance. 5. Patient to be able to walk/stand each 10 min in order to facilitate ADL. Plan: Continue PT 2 x weekly for 8-12 visits. Discharge to Hep/swimming after 1 more session.    Date: 9/1/2022  Tx #: 7/8-12 Date: 9/15/2022  Tx #: 8/8-12 Date: 9/21/2022  Tx #: 9/12 Date: 9/22/2022  Tx #: 10/12 Date: 9/26/2022  Tx #: 11/12   There Ex:   Stationary bike: level 1 x 3   Bridge on blue ball 20 x 3 sec each  Prone L/R quad stretch using strap 3 x 30 sec each  Seated L/R knee ext/hamstrings stretches x 1 min each  Lateral steps at bar, RTB (prox to knees) 2 x ~ 40 feet  Monster walks (ant/post diagonals) using RTB (prox to knees) 2 x ~ 40 feet  Reformer: B squats  X 10 reps - using all springs/tension; L/R unilateral squat 2 x 10 reps each using all springs/tension - verbal/visual cuing for valgus control  B gastroc stretch on slant board 2 x 30 sec each There Ex:   Stationary bike: level 1 x 3   Pool exercise review: lateral and monster walk; squats; SLS with 3 way hip  Lateral steps at bar GTB at ankles 3 x ~16 feet   Monster walks (ant/post diagonals) using GTB at ankles 3 x ~ 16 feet  Reformer: B squats  X 10 reps - using all springs/tension; L/R unilateral squat 2 x 10 reps each using all springs/tension; B heel raises x 20 reps all springs   B gastroc stretch on slant board x 30 sec each  Standing L/R hamstrings stretch x 30 sec each L/R There Ex:   Stationary bike: level 1 x 3  Please see Daily Note for details. There Ex:   Stationary bike: level 1-4 x 3 min  S/L L/R hip ER using blue theraband x 20 reps each  HEP review/instruction; see below.       There Activities:   Pt stated that a knee replacement has been advised by physician, requested info; stated that patients generally do well following TKA but that there is risk for complications with any surgery - to discuss with surgeon, PT necessary following TKA, takes some time to recover; stated that patients generally have TKA when ADL/lifestlye compromised by knee pain/symptoms related to OA There Activities:   May consider cold pack for edema; MHP for achiness x ~ 10 min There Activities:   May consider cold pack for edema; MHP for achiness x ~ 10 min   Manual:   L/R patella sup/inf glides grade II-III  STM R gluteals and ITB using foam roller  Manual:   L/R patella sup/inf glides grade II-III  STM R gluteals and ITB using foam roller  Grade II-III L/R tibia and femur ext mobilization  L/R knee ext ROM/stretch 3 x 15 sec each  S/L L/R quad stretches 2 x 20 sec each  S/L L/R knee flexion ROM/stretch 2 x 20 sec each Manual:   L/R patella sup/inf and med/lat glides grade II-III  Grade II-III L/R tibia and femur ext mobilization  L/R knee ext ROM/stretch 3 x 15 sec each  S/L L/R quad stretches  x 20 sec each  S/L L/R knee flexion ROM/stretch  x 20 sec each Manual:   L/R patella sup/inf and med/lat glides grade II-III  Grade II-III L/R tibia and femur ext mobilization  L/R knee ext ROM/stretch 3 x 15 sec each  S/L L/R quad stretches  x 20 sec each   S/L L/R knee flexion ROM/stretch  x 20 sec each - not L today due to c/o knee discomfort with stretch  STM/DTM R gluteals  STM R ITB using foam roller  Manual:   L/R patella sup/inf and med/lat glides grade II-III  Grade II-III L/R tibia and femur ext mobilization  L/R knee ext ROM/stretch 3 x 15 sec each  S/L L/R quad stretches  x 20 sec each   S/L L/R knee flexion ROM/stretch  x 20 sec each  STM/DTM R gluteals  STM R ITB using foam roller      IFC L knee x 10 min with bolster under knees   IFC L knee x 10 min, MHP B knees x 10 min supine pillow under knees   HEP: Seated L/R knee ext mobilization/stretch, Seated L/R knee flexion ROM/heel slides, s/l hip circles, bridge with ball/pillow for adductor isometric, optional prone quad stretch with strap, LAQ. May consider across body piriformis stretch, STM ITB/gluteals for hip c/o.      Charges: 1 TE, 1 manual, EStim  Total Timed Treatment: 45 min  Total Treatment Time: 50 min

## 2022-09-28 ENCOUNTER — OFFICE VISIT (OUTPATIENT)
Dept: PHYSICAL THERAPY | Age: 57
End: 2022-09-28
Attending: FAMILY MEDICINE

## 2022-09-28 PROCEDURE — 97140 MANUAL THERAPY 1/> REGIONS: CPT | Performed by: PHYSICAL THERAPIST

## 2022-09-28 PROCEDURE — 97110 THERAPEUTIC EXERCISES: CPT | Performed by: PHYSICAL THERAPIST

## 2022-09-28 PROCEDURE — 97530 THERAPEUTIC ACTIVITIES: CPT | Performed by: PHYSICAL THERAPIST

## 2022-09-28 NOTE — PROGRESS NOTES
Elie  Pt has attended 12 visits in Physical Therapy. Diagnosis:   Arthritis of knee (M17.10)   Referring Provider: Vincent Solomon  Date of Evaluation:    6/30/2022    Precautions:  None Next MD visit:   none scheduled  Date of Surgery: n/a   Patient requested : [facility] #[id number]    Insurance Primary/Secondary: [address] [place], [place] [id number] / N/A     # Auth Visits: 12 visits through 9/29/2022        Subjective:  My knees are so so. Since started PT, I can stand with less pain in my knees, increased ease with sit - stand transfer after having sat, less swelling, less pain with ascending stairs, go downstairs backward due to knee c/o, arch support in shoes helps. Compliant with PT HEP and swimming. Increased R hip ER with PT exercises, less hip pain. Have home TENS unit that I can use on my knees. Pain: 2/10      Objective:  Observation: Standing: B genu valgus, B knees remain slightly flexed, mild pes planus     AROM: (* denotes performed with pain)  Knee   Flexion: R 130; L 120  Extension: R -15; L -10        Flexibility: L/R hamstrings WNL; L/R quadriceps - restriction  Accessory motion: Hypomobile R patella superior glide B patella mobility WNL otherwise. Strength/MMT: (* denotes performed with pain)  Hip Knee    Extension: R 4+/5; L 4+/5  Abduction: R 5/5; L 5/5  ER: R 5-/5; L 5-/5 Flexion: R 5/5; L 5/5  Extension: R 5/5; L 5/5        Gait: pt ambulates on level ground with B genu valgus  Stairs: Pt able to reciprocally ascend 6 inch steps; she descends 6 inch steps laterally with c/o knee pain. Assessment:   Patient with subjective and objective progress since initiating PT. HEP/considerations for management of knee pain/OA have been reviewed with patient; she will continue with these as well tolerated. She is to f/u with physician for any unresolved c/o/concerns. Goals:    (to be met in 8-12 visits)  1.  Patient to report independence with PT HEP to facilitate self management and to maintain progress made with PT.  - met  2. Patient to have at least - 10 deg active L/R knee ext to facilitate heel strike of gait and standing tolerance. - limited progress  3. Pt to have at least 125 deg active L/R knee flexion to facilitate transfers, retrieving objects from ground. - met R, not L  4. Patient to have 5-/5 gross hip/knee MMT for improved stair negotiation, squatting tolerance. - overall met  5. Patient to be able to walk/stand each 10 min in order to facilitate ADL. - overall met    Plan: Patient to continue with HEP as well tolerated/appropriate due to progress, independence with PT HEP. She is to f/u with physician for any unresolved c/o/concerns. Date: 9/15/2022  Tx #: 8/8-12 Date: 9/21/2022  Tx #: 9/12 Date: 9/22/2022  Tx #: 10/12 Date: 9/26/2022  Tx #: 11/12 Date: 9/28/2022  Tx :12/12   There Ex:   Stationary bike: level 1 x 3   Pool exercise review: lateral and monster walk; squats; SLS with 3 way hip  Lateral steps at bar GTB at ankles 3 x ~16 feet   Monster walks (ant/post diagonals) using GTB at ankles 3 x ~ 16 feet  Reformer: B squats  X 10 reps - using all springs/tension; L/R unilateral squat 2 x 10 reps each using all springs/tension; B heel raises x 20 reps all springs   B gastroc stretch on slant board x 30 sec each  Standing L/R hamstrings stretch x 30 sec each L/R There Ex:   Stationary bike: level 1 x 3  Please see Daily Note for details. There Ex:   Stationary bike: level 1-4 x 3 min  S/L L/R hip ER using blue theraband x 20 reps each  HEP review/instruction; see below. There Ex:   HEP review/instruction/handout; see below. Self L/R patella mobilization sup/inf, med/lat glides   ;   There Activities:   Pt stated that a knee replacement has been advised by physician, requested info; stated that patients generally do well following TKA but that there is risk for complications with any surgery - to discuss with surgeon, PT necessary following TKA, takes some time to recover; stated that patients generally have TKA when ADL/lifestlye compromised by knee pain/symptoms related to OA There Activities:   May consider cold pack for edema; MHP for achiness x ~ 10 min There Activities:   May consider cold pack for edema; MHP for achiness x ~ 10 min There Activities:   Reviewed considerations for knees: knee brace with patella aperture, shoes with arch support, MHP, cold pack; home TENS unit (pt reports has one)   Manual:   L/R patella sup/inf glides grade II-III  STM R gluteals and ITB using foam roller  Grade II-III L/R tibia and femur ext mobilization  L/R knee ext ROM/stretch 3 x 15 sec each  S/L L/R quad stretches 2 x 20 sec each  S/L L/R knee flexion ROM/stretch 2 x 20 sec each Manual:   L/R patella sup/inf and med/lat glides grade II-III  Grade II-III L/R tibia and femur ext mobilization  L/R knee ext ROM/stretch 3 x 15 sec each  S/L L/R quad stretches  x 20 sec each  S/L L/R knee flexion ROM/stretch  x 20 sec each Manual:   L/R patella sup/inf and med/lat glides grade II-III  Grade II-III L/R tibia and femur ext mobilization  L/R knee ext ROM/stretch 3 x 15 sec each  S/L L/R quad stretches  x 20 sec each   S/L L/R knee flexion ROM/stretch  x 20 sec each - not L today due to c/o knee discomfort with stretch  STM/DTM R gluteals  STM R ITB using foam roller  Manual:   L/R patella sup/inf and med/lat glides grade II-III  Grade II-III L/R tibia and femur ext mobilization  L/R knee ext ROM/stretch 3 x 15 sec each  S/L L/R quad stretches  x 20 sec each   S/L L/R knee flexion ROM/stretch  x 20 sec each  STM/DTM R gluteals  STM R ITB using foam roller  Manual:   L/R patella sup/inf and med/lat glides grade II-III  Grade II-III L/R tibia and femur ext mobilization  L/R knee ext ROM/stretch 3 x 15 sec each  S/L L/R quad stretches  x 20 sec each   S/L L/R knee flexion ROM/stretch  x 20 sec each    IFC L knee x 10 min with bolster under knees   IFC L knee x 10 min, MHP B knees x 10 min supine pillow under knees    HEP: Seated L/R knee ext mobilization/stretch, Seated or supine L/R knee flexion ROM/heel slides, s/l hip circles, bridge with ball/pillow for adductor isometric, prone quad stretch with strap, LAQ; sup/inf patella glides. May consider across body piriformis stretch, STM ITB/gluteals for hip c/o. Charges: 1 TE, 1 manual, 1 TA  Total Timed Treatment: 40 min  Total Treatment Time: 45 min  FOTO: [phone]    Plan: Patient to continue with HEP as well tolerated/appropriate due to progress, independence with PT HEP. She is to f/u with physician for any unresolved c/o/concerns. Patient/Family/Caregiver was advised of these findings, precautions, and treatment options and has agreed to actively participate in planning and for this course of care. Thank you for your referral. If you have any questions, please contact me at Dept: [phone]. Sincerely,  Electronically signed by therapist: Mary Hay PT     Physician's certification required:  No  Please co-sign or sign and return this letter via fax as soon as possible to [phone]. I certify the need for these services furnished under this plan of treatment and while under my care.     X___________________________________________________ Date____________________    Certification From: 1/49/3122  To:12/27/2022

## 2023-06-12 ENCOUNTER — OFFICE VISIT (OUTPATIENT)
Dept: FAMILY MEDICINE CLINIC | Facility: CLINIC | Age: 58
End: 2023-06-12
Payer: COMMERCIAL

## 2023-06-12 VITALS
WEIGHT: 155 LBS | SYSTOLIC BLOOD PRESSURE: 136 MMHG | TEMPERATURE: 97 F | HEART RATE: 101 BPM | OXYGEN SATURATION: 99 % | BODY MASS INDEX: 26.46 KG/M2 | RESPIRATION RATE: 18 BRPM | DIASTOLIC BLOOD PRESSURE: 82 MMHG | HEIGHT: 64 IN

## 2023-06-12 DIAGNOSIS — Z71.1 PERSON WITH FEARED COMPLAINT, NO DIAGNOSIS MADE: ICD-10-CM

## 2023-06-12 DIAGNOSIS — B34.9 VIRAL SYNDROME: Primary | ICD-10-CM
